# Patient Record
Sex: MALE | Race: WHITE | HISPANIC OR LATINO | URBAN - METROPOLITAN AREA
[De-identification: names, ages, dates, MRNs, and addresses within clinical notes are randomized per-mention and may not be internally consistent; named-entity substitution may affect disease eponyms.]

---

## 2023-02-22 ENCOUNTER — EMERGENCY (EMERGENCY)
Facility: HOSPITAL | Age: 63
LOS: 0 days | Discharge: ANOTHER HOSPITAL | End: 2023-02-22
Attending: STUDENT IN AN ORGANIZED HEALTH CARE EDUCATION/TRAINING PROGRAM
Payer: COMMERCIAL

## 2023-02-22 VITALS
DIASTOLIC BLOOD PRESSURE: 87 MMHG | RESPIRATION RATE: 20 BRPM | OXYGEN SATURATION: 95 % | HEART RATE: 87 BPM | SYSTOLIC BLOOD PRESSURE: 114 MMHG | WEIGHT: 185.19 LBS

## 2023-02-22 VITALS — TEMPERATURE: 97 F
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
## 2023-02-22 DIAGNOSIS — R74.8 ABNORMAL LEVELS OF OTHER SERUM ENZYMES: ICD-10-CM

## 2023-02-22 DIAGNOSIS — R06.02 SHORTNESS OF BREATH: ICD-10-CM

## 2023-02-22 DIAGNOSIS — E78.5 HYPERLIPIDEMIA, UNSPECIFIED: ICD-10-CM

## 2023-02-22 DIAGNOSIS — R55 SYNCOPE AND COLLAPSE: ICD-10-CM
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
## 2023-02-22 DIAGNOSIS — S00.83XA CONTUSION OF OTHER PART OF HEAD, INITIAL ENCOUNTER: ICD-10-CM

## 2023-02-22 DIAGNOSIS — I70.0 ATHEROSCLEROSIS OF AORTA: ICD-10-CM

## 2023-02-22 DIAGNOSIS — I95.9 HYPOTENSION, UNSPECIFIED: ICD-10-CM

## 2023-02-22 DIAGNOSIS — R07.89 OTHER CHEST PAIN: ICD-10-CM

## 2023-02-22 DIAGNOSIS — E11.9 TYPE 2 DIABETES MELLITUS WITHOUT COMPLICATIONS: ICD-10-CM

## 2023-02-22 DIAGNOSIS — I10 ESSENTIAL (PRIMARY) HYPERTENSION: ICD-10-CM

## 2023-02-22 DIAGNOSIS — Z20.822 CONTACT WITH AND (SUSPECTED) EXPOSURE TO COVID-19: ICD-10-CM

## 2023-02-22 LAB
ALBUMIN SERPL ELPH-MCNC: 4.4 G/DL — SIGNIFICANT CHANGE UP (ref 3.5–5.2)
ALP SERPL-CCNC: 92 U/L — SIGNIFICANT CHANGE UP (ref 30–115)
ALT FLD-CCNC: 48 U/L — HIGH (ref 0–41)
ANION GAP SERPL CALC-SCNC: 12 MMOL/L — SIGNIFICANT CHANGE UP (ref 7–14)
APTT BLD: 33.5 SEC — SIGNIFICANT CHANGE UP (ref 27–39.2)
AST SERPL-CCNC: 36 U/L — SIGNIFICANT CHANGE UP (ref 0–41)
BASOPHILS # BLD AUTO: 0.03 K/UL — SIGNIFICANT CHANGE UP (ref 0–0.2)
BASOPHILS NFR BLD AUTO: 0.3 % — SIGNIFICANT CHANGE UP (ref 0–1)
BILIRUB SERPL-MCNC: 0.5 MG/DL — SIGNIFICANT CHANGE UP (ref 0.2–1.2)
BUN SERPL-MCNC: 23 MG/DL — HIGH (ref 10–20)
CALCIUM SERPL-MCNC: 10.2 MG/DL — SIGNIFICANT CHANGE UP (ref 8.4–10.5)
CHLORIDE SERPL-SCNC: 97 MMOL/L — LOW (ref 98–110)
CO2 SERPL-SCNC: 21 MMOL/L — SIGNIFICANT CHANGE UP (ref 17–32)
CREAT SERPL-MCNC: 1.7 MG/DL — HIGH (ref 0.7–1.5)
EGFR: 45 ML/MIN/1.73M2 — LOW
EOSINOPHIL # BLD AUTO: 0.08 K/UL — SIGNIFICANT CHANGE UP (ref 0–0.7)
EOSINOPHIL NFR BLD AUTO: 0.7 % — SIGNIFICANT CHANGE UP (ref 0–8)
GAS PNL BLDV: SIGNIFICANT CHANGE UP
GLUCOSE SERPL-MCNC: 395 MG/DL — HIGH (ref 70–99)
HCT VFR BLD CALC: 44.6 % — SIGNIFICANT CHANGE UP (ref 42–52)
HGB BLD-MCNC: 15.1 G/DL — SIGNIFICANT CHANGE UP (ref 14–18)
IMM GRANULOCYTES NFR BLD AUTO: 0.9 % — HIGH (ref 0.1–0.3)
INR BLD: 0.99 RATIO — SIGNIFICANT CHANGE UP (ref 0.65–1.3)
LYMPHOCYTES # BLD AUTO: 0.53 K/UL — LOW (ref 1.2–3.4)
LYMPHOCYTES # BLD AUTO: 4.5 % — LOW (ref 20.5–51.1)
MAGNESIUM SERPL-MCNC: 2.1 MG/DL — SIGNIFICANT CHANGE UP (ref 1.8–2.4)
MCHC RBC-ENTMCNC: 28.2 PG — SIGNIFICANT CHANGE UP (ref 27–31)
MCHC RBC-ENTMCNC: 33.9 G/DL — SIGNIFICANT CHANGE UP (ref 32–37)
MCV RBC AUTO: 83.2 FL — SIGNIFICANT CHANGE UP (ref 80–94)
MONOCYTES # BLD AUTO: 0.75 K/UL — HIGH (ref 0.1–0.6)
MONOCYTES NFR BLD AUTO: 6.3 % — SIGNIFICANT CHANGE UP (ref 1.7–9.3)
NEUTROPHILS # BLD AUTO: 10.4 K/UL — HIGH (ref 1.4–6.5)
NEUTROPHILS NFR BLD AUTO: 87.3 % — HIGH (ref 42.2–75.2)
NRBC # BLD: 0 /100 WBCS — SIGNIFICANT CHANGE UP (ref 0–0)
NT-PROBNP SERPL-SCNC: 436 PG/ML — HIGH (ref 0–300)
PLATELET # BLD AUTO: 270 K/UL — SIGNIFICANT CHANGE UP (ref 130–400)
POTASSIUM SERPL-MCNC: 6.3 MMOL/L — CRITICAL HIGH (ref 3.5–5)
POTASSIUM SERPL-SCNC: 6.3 MMOL/L — CRITICAL HIGH (ref 3.5–5)
PROT SERPL-MCNC: 6.8 G/DL — SIGNIFICANT CHANGE UP (ref 6–8)
PROTHROM AB SERPL-ACNC: 11.3 SEC — SIGNIFICANT CHANGE UP (ref 9.95–12.87)
RBC # BLD: 5.36 M/UL — SIGNIFICANT CHANGE UP (ref 4.7–6.1)
RBC # FLD: 13.3 % — SIGNIFICANT CHANGE UP (ref 11.5–14.5)
SARS-COV-2 RNA SPEC QL NAA+PROBE: SIGNIFICANT CHANGE UP
SODIUM SERPL-SCNC: 130 MMOL/L — LOW (ref 135–146)
TROPONIN T SERPL-MCNC: 0.04 NG/ML — CRITICAL HIGH
TROPONIN T SERPL-MCNC: 0.3 NG/ML — CRITICAL HIGH
WBC # BLD: 11.9 K/UL — HIGH (ref 4.8–10.8)
WBC # FLD AUTO: 11.9 K/UL — HIGH (ref 4.8–10.8)

## 2023-02-22 PROCEDURE — 84484 ASSAY OF TROPONIN QUANT: CPT

## 2023-02-22 PROCEDURE — 72170 X-RAY EXAM OF PELVIS: CPT | Mod: 26

## 2023-02-22 PROCEDURE — 85610 PROTHROMBIN TIME: CPT

## 2023-02-22 PROCEDURE — 86901 BLOOD TYPING SEROLOGIC RH(D): CPT

## 2023-02-22 PROCEDURE — 82330 ASSAY OF CALCIUM: CPT

## 2023-02-22 PROCEDURE — U0003: CPT

## 2023-02-22 PROCEDURE — 82962 GLUCOSE BLOOD TEST: CPT

## 2023-02-22 PROCEDURE — 85014 HEMATOCRIT: CPT

## 2023-02-22 PROCEDURE — 73610 X-RAY EXAM OF ANKLE: CPT | Mod: LT

## 2023-02-22 PROCEDURE — 84295 ASSAY OF SERUM SODIUM: CPT

## 2023-02-22 PROCEDURE — U0005: CPT

## 2023-02-22 PROCEDURE — 83880 ASSAY OF NATRIURETIC PEPTIDE: CPT

## 2023-02-22 PROCEDURE — 99285 EMERGENCY DEPT VISIT HI MDM: CPT | Mod: 25

## 2023-02-22 PROCEDURE — 93010 ELECTROCARDIOGRAM REPORT: CPT

## 2023-02-22 PROCEDURE — 72170 X-RAY EXAM OF PELVIS: CPT

## 2023-02-22 PROCEDURE — 82803 BLOOD GASES ANY COMBINATION: CPT

## 2023-02-22 PROCEDURE — 85730 THROMBOPLASTIN TIME PARTIAL: CPT

## 2023-02-22 PROCEDURE — 93970 EXTREMITY STUDY: CPT

## 2023-02-22 PROCEDURE — 85025 COMPLETE CBC W/AUTO DIFF WBC: CPT

## 2023-02-22 PROCEDURE — 71045 X-RAY EXAM CHEST 1 VIEW: CPT

## 2023-02-22 PROCEDURE — 84132 ASSAY OF SERUM POTASSIUM: CPT

## 2023-02-22 PROCEDURE — 83605 ASSAY OF LACTIC ACID: CPT

## 2023-02-22 PROCEDURE — 99285 EMERGENCY DEPT VISIT HI MDM: CPT

## 2023-02-22 PROCEDURE — 70450 CT HEAD/BRAIN W/O DYE: CPT | Mod: 26,MA

## 2023-02-22 PROCEDURE — 73610 X-RAY EXAM OF ANKLE: CPT | Mod: 26,LT

## 2023-02-22 PROCEDURE — 83735 ASSAY OF MAGNESIUM: CPT

## 2023-02-22 PROCEDURE — 93970 EXTREMITY STUDY: CPT | Mod: 26

## 2023-02-22 PROCEDURE — 93005 ELECTROCARDIOGRAM TRACING: CPT

## 2023-02-22 PROCEDURE — 86900 BLOOD TYPING SEROLOGIC ABO: CPT

## 2023-02-22 PROCEDURE — 71045 X-RAY EXAM CHEST 1 VIEW: CPT | Mod: 26

## 2023-02-22 PROCEDURE — 85018 HEMOGLOBIN: CPT

## 2023-02-22 PROCEDURE — 70450 CT HEAD/BRAIN W/O DYE: CPT | Mod: MA

## 2023-02-22 PROCEDURE — 86850 RBC ANTIBODY SCREEN: CPT

## 2023-02-22 PROCEDURE — 80053 COMPREHEN METABOLIC PANEL: CPT

## 2023-02-22 PROCEDURE — 36415 COLL VENOUS BLD VENIPUNCTURE: CPT

## 2023-02-22 RX ORDER — SODIUM ZIRCONIUM CYCLOSILICATE 10 G/10G
10 POWDER, FOR SUSPENSION ORAL ONCE
Refills: 0 | Status: COMPLETED | OUTPATIENT
Start: 2023-02-22 | End: 2023-02-22

## 2023-02-22 RX ADMIN — SODIUM ZIRCONIUM CYCLOSILICATE 10 GRAM(S): 10 POWDER, FOR SUSPENSION ORAL at 14:03

## 2023-02-22 NOTE — ED PROVIDER NOTE - OBJECTIVE STATEMENT
Pt is a 63 y/o male with PMHx of HTN, HLD, and DM presenting after feeling short of breath and a syncopal episode while at work [employer]. Pt states he was scheduled for a valve replacement surgery tomorrow at [facility].     Cardio [facility]  Pt is a 61 y/o male with PMHx of HTN, HLD, and DM presenting after feeling short of breath and had a syncopal episode while at work [employer]TA. He states he felt light headed and weak on his way into work. After using the restroom and going to his office, he felt the same symptoms again and syncopized. He was found by coworker who called EMS. In the ED, pt admits to chest tightness and feeling tired. Pt states he was scheduled for a valve replacement surgery tomorrow at [facility]. Cardiology at bedside for history will reach out to Dr Ro. He denies any complaints of fever, chills, cough, sore throat, N/V/D/C, abdominal pain, current SoB, or urinary complaints.     Cardio [facility] Dr Ro

## 2023-02-22 NOTE — ED ADULT NURSE REASSESSMENT NOTE - NS ED NURSE REASSESS COMMENT FT1
Pt transferred to [facility] via EMS, pt alert & oriented. Denies any pain or discomfort. In agreement with transfer.

## 2023-02-22 NOTE — ED ADULT NURSE NOTE - CHIEF COMPLAINT QUOTE
Pre note BIBEMS stemi- S/p syncopal episode +head trauma, no AC Pt denies chest pain at the moment, stated to have been feeling dizzy before fall Pt due for valve replacement tomorrow

## 2023-02-22 NOTE — ED PROVIDER NOTE - PROGRESS NOTE DETAILS
s/o Dr. Dumont f/u workup including ct head, chest xray, pelvis xray, stat echo and re-eval stemi code cancelled DC: Patient endorsed to me by Dr. Prajapati.  Patient with history of aortic stenosis with plan for repair tomorrow at [facility], presents today with syncope.  States that he has had multiple episodes syncope in the past similar to this.  Endorses chest tightness as well as lightheadedness prior to event, starts to walk up the stairs to the office and symptoms progress.  Patient had an episode of syncope in front of his office.  At this time denies any symptoms.  Patient does have injury to the left lower extremity incidentally states that he pulled a muscle couple days ago.  Has been ambulating since however noted to have edema to the left lower extremity compared to the right.  Tenderness to the left ankle.  Full range of motion intact. Had a cath in Dec of 2022 that he states did not show evidence of any significant CAD, no stents placed. DC: patient remains stable however rpt troponin now 0.30. cardiology team at [facility] and cardio at [facility] will be made aware.

## 2023-02-22 NOTE — CHART NOTE - NSCHARTNOTEFT_GEN_A_CORE
Patient was seen and examined at bedside with attending.     Patient had syncope at home; denies any chest pain or sob    Bedside echo showing normal LVF with severe AS.     initial ems EKG showing transient ischemia likely secondary to AS with resolution of ST changes on repeat EKG    Code STEMI was cancelled.     Patient scheduled for AS repair tomorrow at [facility]    Attending spoke to [facility] and plan is to transfer pt to [facility] for further care.     Yaya Mazariegos ([id number])

## 2023-02-22 NOTE — ED PROVIDER NOTE - ATTENDING APP SHARED VISIT CONTRIBUTION OF CARE
62-year-old male history of hypertension dyslipidemia diabetes with planned aortic valve replacement tomorrow by [facility] here evaluation of syncope and chest pain.  Patient reports that he has syncopal episode at work hitting his head.  There is no seizure activity seen by bystanders with patient complaining of chest pain and shortness of breath.  In the field the patient was hypotensive and EKG with ant st elevations and inferior lead depression in lateral and inferior ST depressions.  STEMI code was called per the EMS notification system and patient seen at bedside by cardiology.  In the ED the initial blood pressure pressure has improved and patient denies sx's.  On exam there is a contusion to the forehead with no pain impression ribs pelvis stable full and motion x4 extremities GCS 15 S1-S2 clear to auscultation bilaterally soft nontender  Impression  Patient here with syncopal episode in setting of known valvular disease with plans for operation tomorrow.  Initial EKG likely due to hypotension.  STEMI code was called to the bedside by cardiology and plan is for admission to CCU for further monitoring.  Etiology of symptoms likely to due to the valve. 62-year-old male history of hypertension dyslipidemia diabetes with planned aortic valve replacement tomorrow by [facility] here evaluation of syncope and chest pain.  Patient reports that he has syncopal episode at work hitting his head.  There is no seizure activity seen by bystanders with patient complaining of chest pain and shortness of breath.  In the field the patient was hypotensive and EKG with ant st elevations and inferior lead depression in lateral and inferior ST depressions.  STEMI code was called per the EMS notification system and patient seen at bedside by cardiology.  In the ED the initial blood pressure pressure has improved and patient denies sx's.  On exam there is a contusion to the forehead with no pain impression ribs pelvis stable full and motion x4 extremities GCS 15 S1-S2 systolic murmor clear to auscultation bilaterally soft nontender  Impression  Patient here with syncopal episode in setting of known valvular disease with plans for operation tomorrow.  Initial EKG likely due to hypotension.  STEMI code was called to the bedside by cardiology and plan is for admission to CCU for further monitoring.  Etiology of symptoms likely to due to the valve.

## 2023-02-22 NOTE — ED PROVIDER NOTE - PHYSICAL EXAMINATION
As Follows:  CONST: Pt lying in stretcher.  EYES: EOMI, Sclera and conjunctiva clear.  HEENT: Small abrasion to frontal head, no tenderness, laceration, bleeding, or other signs of trauma/injury. No nasal discharge. Oropharynx normal appearing, no erythema or exudates. Uvula midline.  CARD: Normal S1 S2; Normal rate and rhythm  RESP: Equal BS B/L, No wheezes, rhonchi or rales. No distressed breathing.   MS: Normal ROM in all extremities. No midline spinal tenderness.  SKIN: Warm, dry, no acute rashes. Good turgor  NEURO: A&Ox3, No focal deficits. Strength and sensation intact. As Follows:  CONST: Pt lying in stretcher.  EYES: EOMI, Sclera and conjunctiva clear.  HEENT: Small abrasion to frontal head, no tenderness, laceration, bleeding, or other signs of trauma/injury. No nasal discharge. Oropharynx normal appearing, no erythema or exudates. Uvula midline.  CARD: Normal S1 S2; Normal rate and rhythm  RESP: Equal BS B/L, No wheezes, rhonchi or rales. No distressed breathing.   MS: LLE swelling at the calf to ankle. Pain with RoM testing. Normal ROM in all extremities. No midline spinal tenderness.  SKIN: Warm, dry, no acute rashes. Good turgor  NEURO: A&Ox3, No focal deficits. Strength and sensation intact.

## 2023-02-27 LAB — GLUCOSE BLDC GLUCOMTR-MCNC: 418 MG/DL — HIGH (ref 70–99)
